# Patient Record
Sex: MALE | Race: WHITE | NOT HISPANIC OR LATINO | Employment: FULL TIME | URBAN - NONMETROPOLITAN AREA
[De-identification: names, ages, dates, MRNs, and addresses within clinical notes are randomized per-mention and may not be internally consistent; named-entity substitution may affect disease eponyms.]

---

## 2017-08-12 ENCOUNTER — OFFICE VISIT (OUTPATIENT)
Dept: URGENT CARE | Facility: PHYSICIAN GROUP | Age: 37
End: 2017-08-12
Payer: COMMERCIAL

## 2017-08-12 VITALS
TEMPERATURE: 98.2 F | RESPIRATION RATE: 18 BRPM | BODY MASS INDEX: 19.78 KG/M2 | OXYGEN SATURATION: 94 % | DIASTOLIC BLOOD PRESSURE: 70 MMHG | HEART RATE: 114 BPM | HEIGHT: 67 IN | WEIGHT: 126 LBS | SYSTOLIC BLOOD PRESSURE: 104 MMHG

## 2017-08-12 DIAGNOSIS — R06.2 WHEEZE: ICD-10-CM

## 2017-08-12 DIAGNOSIS — R06.02 SOB (SHORTNESS OF BREATH): Primary | ICD-10-CM

## 2017-08-12 PROCEDURE — 99203 OFFICE O/P NEW LOW 30 MIN: CPT | Mod: 25 | Performed by: PHYSICIAN ASSISTANT

## 2017-08-12 PROCEDURE — 94640 AIRWAY INHALATION TREATMENT: CPT | Performed by: PHYSICIAN ASSISTANT

## 2017-08-12 RX ORDER — ALBUTEROL SULFATE 90 UG/1
2 AEROSOL, METERED RESPIRATORY (INHALATION) EVERY 4 HOURS PRN
Qty: 1 INHALER | Refills: 0 | Status: SHIPPED | OUTPATIENT
Start: 2017-08-12

## 2017-08-12 RX ORDER — OXYCODONE AND ACETAMINOPHEN 10; 325 MG/1; MG/1
TABLET ORAL
Refills: 0 | COMMUNITY
Start: 2017-08-04

## 2017-08-12 RX ORDER — ALBUTEROL SULFATE 2.5 MG/3ML
2.5 SOLUTION RESPIRATORY (INHALATION) ONCE
Status: COMPLETED | OUTPATIENT
Start: 2017-08-12 | End: 2017-08-12

## 2017-08-12 RX ORDER — OXYMORPHONE HYDROCHLORIDE 15 MG/1
TABLET, FILM COATED, EXTENDED RELEASE ORAL
Refills: 0 | COMMUNITY
Start: 2017-06-06

## 2017-08-12 RX ORDER — ZOLPIDEM TARTRATE 10 MG/1
TABLET ORAL
Refills: 0 | COMMUNITY
Start: 2017-08-01

## 2017-08-12 RX ADMIN — ALBUTEROL SULFATE 2.5 MG: 2.5 SOLUTION RESPIRATORY (INHALATION) at 12:41

## 2017-08-12 NOTE — MR AVS SNAPSHOT
"        Jean Marie Field   2017 11:05 AM   Office Visit   MRN: [id number]    Department:  [facility]   Dept Phone:  [phone]    Description:  Male : 1980   Provider:  Sander Howell PA-C           Reason for Visit     Shortness of Breath difficulty breathing, slight unprductive cough      Allergies as of 2017     Allergen Noted Reactions    Amoxicillin 2014       Rash per mother    Prednisone 2014       Vascular necrosis to hip bones      You were diagnosed with     SOB (shortness of breath)   [840919]  -  Primary     Wheeze   [[id number]]         Vital Signs     Blood Pressure Pulse Temperature Respirations Height Weight    104/70 mmHg 114 36.8 °C (98.2 °F) 18 1.702 m (5' 7\") 57.153 kg (126 lb)    Body Mass Index Oxygen Saturation Smoking Status             19.73 kg/m2 94% Current Every Day Smoker         Basic Information     Date Of Birth Sex Race Ethnicity Preferred Language    1980 Male White Non- English      Problem List              ICD-10-CM Priority Class Noted - Resolved    Seizure (CMS-HCC) R56.9   2014 - Present    Tobacco abuse Z72.0   2014 - Present      Health Maintenance        Date Due Completion Dates    IMM DTaP/Tdap/Td Vaccine (1 - Tdap) 7/10/1998 ---    IMM PNEUMOCOCCAL 19-64 (ADULT) MEDIUM RISK SERIES (1 of 1 - PPSV23) 7/10/1998 ---    IMM INFLUENZA (1) 2017 ---            Current Immunizations     No immunizations on file.      Below and/or attached are the medications your provider expects you to take. Review all of your home medications and newly ordered medications with your provider and/or pharmacist. Follow medication instructions as directed by your provider and/or pharmacist. Please keep your medication list with you and share with your provider. Update the information when medications are discontinued, doses are changed, or new medications (including over-the-counter products) are added; and carry medication information at " all times in the event of emergency situations     Allergies:  AMOXICILLIN - (reactions not documented)     PREDNISONE - (reactions not documented)               Medications  Valid as of: August 12, 2017 - 11:59 AM    Generic Name Brand Name Tablet Size Instructions for use    Albuterol Sulfate (Aero Soln) albuterol 108 (90 BASE) MCG/ACT Inhale 2 Puffs by mouth every four hours as needed.        Carisoprodol (Tab) SOMA 350 MG Take 1 Tab by mouth at bedtime as needed for Muscle Spasms.        Cyclobenzaprine HCl (Tab) FLEXERIL 10 MG Take 1 Tab by mouth 3 times a day as needed for Muscle Spasms.        FentaNYL (PATCH 72 HR) DURAGESIC 25 MCG/HR Apply 1 Patch to skin as directed every 72 hours.        Nicotine (PATCH 24 HR) NICODERM 14 MG/24HR Apply 1 Patch to skin as directed every day.        OxyCODONE HCl (Tab) ROXICODONE 5 MG Take 1 Tab by mouth every four hours as needed for Mild Pain.        Oxycodone-Acetaminophen (Tab) PERCOCET-10  MG TAKE 1 TAB BY MOUTH 4 TIMES A DAY AS NEEDED FOR PAIN        OxyMORphone HCl (TABLET SR 12 HR) Oxymorphone HCl 15 MG TAKE 1 TAB BY MOUTH TWICE DAILY        PARoxetine HCl (Tab) PAXIL 20 MG Take 20 mg by mouth every day.        Zolpidem Tartrate (Tab CR) AMBIEN CR 12.5 MG Take 12.5 mg by mouth at bedtime as needed. Indications: Trouble Sleeping        Zolpidem Tartrate (Tab) AMBIEN 10 MG TAKE 1 TAB BY MOUTH AT BEDTIME        .                 Medicines prescribed today were sent to:     [facility][id number] - [place] - [address]    [facility] [address] [place] [address]    Phone: [phone] Fax: [phone]    Open 24 Hours?: No      Medication refill instructions:       If your prescription bottle indicates you have medication refills left, it is not necessary to call your provider’s office. Please contact your pharmacy and they will refill your medication.    If your prescription bottle indicates you do not have any refills left, you may request refills at any time  through one of the following ways: The online Wonder[employer] system (except Urgent Care), by calling your provider’s office, or by asking your pharmacy to contact your provider’s office with a refill request. Medication refills are processed only during regular business hours and may not be available until the next business day. Your provider may request additional information or to have a follow-up visit with you prior to refilling your medication.   *Please Note: Medication refills are assigned a new Rx number when refilled electronically. Your pharmacy may indicate that no refills were authorized even though a new prescription for the same medication is available at the pharmacy. Please request the medicine by name with the pharmacy before contacting your provider for a refill.           WonderHill Access Code: [email or web address]  Expires: 9/11/2017 11:59 AM    [employer]  A secure, online tool to manage your health information     [email or web address]’s WonderHill® is a secure, online tool that connects you to your personalized health information from the privacy of your home -- day or night - making it very easy for you to manage your healthcare. Once the activation process is completed, you can even access your medical information using the WonderHill armando, which is available for free in the Apple Armando store or Google Play store.     WonderHill provides the following levels of access (as shown below):   My Chart Features   [facility] Doctor [facility]  Specialists [facility]  Urgent  Care Non-[facility]  Primary Care  Doctor   Email your healthcare team securely and privately 24/7 X X X    Manage appointments: schedule your next appointment; view details of past/upcoming appointments X      Request prescription refills. X      View recent personal medical records, including lab and immunizations X X X X   View health record, including health history, allergies, medications X X X X   Read reports about your outpatient visits, procedures, consult  and ER notes X X X X   See your discharge summary, which is a recap of your hospital and/or ER visit that includes your diagnosis, lab results, and care plan. X X       How to register for [employer]:  1. Go to  [email or web address].  2. Click on the Sign Up Now box, which takes you to the New Member Sign Up page. You will need to provide the following information:  a. Enter your Konotor Access Code exactly as it appears at the top of this page. (You will not need to use this code after you’ve completed the sign-up process. If you do not sign up before the expiration date, you must request a new code.)   b. Enter your date of birth.   c. Enter your home email address.   d. Click Submit, and follow the next screen’s instructions.  3. Create a Konotor ID. This will be your Konotor login ID and cannot be changed, so think of one that is secure and easy to remember.  4. Create a Konotor password. You can change your password at any time.  5. Enter your Password Reset Question and Answer. This can be used at a later time if you forget your password.   6. Enter your e-mail address. This allows you to receive e-mail notifications when new information is available in Konotor.  7. Click Sign Up. You can now view your health information.    For assistance activating your Konotor account, call [phone]        Quit Tobacco Information     Do you want to quit using tobacco?    Quitting tobacco decreases risks of cancer, heart and lung disease, increases life expectancy, improves sense of taste and smell, and increases spending money, among other benefits.    If you are thinking about quitting, we can help.  • [employer] Quit Tobacco Program: 990.174.2951  o Program occurs weekly for four weeks and includes pharmacist consultation on products to support quitting smoking or chewing tobacco. A provider referral is needed for pharmacist consultation.  • Tobacco Users Help Hotline: 7-734-QUIT-NOW (625-0669) or  https://[place].quitlogix.org/  o Free, confidential telephone and online coaching for [place] residents. Sessions are designed on a schedule that is convenient for you. Eligible clients receive free nicotine replacement therapy.  • Nationally: www.smokefree.gov  o Information and professional assistance to support both immediate and long-term needs as you become, and remain, a non-smoker. Smokefree.gov allows you to choose the help that best fits your needs.

## 2017-08-12 NOTE — PROGRESS NOTES
Chief Complaint   Patient presents with   • Shortness of Breath     difficulty breathing, slight unprductive cough       HISTORY OF PRESENT ILLNESS: Patient is a 37 y.o. male who presents today because he has only history of difficulty breathing, wheezing, dry cough. He has been getting worse, sometimes having to rest with even short walking. He has not been taking any medications for his symptoms, he tried to find an old Primatene mist inhaler but he could not find one. He does not have a history of asthma, but has had episodes like this years ago    Patient Active Problem List    Diagnosis Date Noted   • Seizure (CMS-[facility]) 04/12/2014   • Tobacco abuse 04/12/2014       Allergies:Amoxicillin and Prednisone    Current Outpatient Prescriptions Ordered in [facility]   Medication Sig Dispense Refill   • oxycodone-acetaminophen (PERCOCET-10)  MG Tab TAKE 1 TAB BY MOUTH 4 TIMES A DAY AS NEEDED FOR PAIN  0   • albuterol 108 (90 BASE) MCG/ACT Aero Soln inhalation aerosol Inhale 2 Puffs by mouth every four hours as needed. 1 Inhaler 0   • zolpidem (AMBIEN) 10 MG Tab TAKE 1 TAB BY MOUTH AT BEDTIME  0   • Oxymorphone HCl 15 MG TABLET SR 12 HR TAKE 1 TAB BY MOUTH TWICE DAILY  0   • carisoprodol (SOMA) 350 MG TABS Take 1 Tab by mouth at bedtime as needed for Muscle Spasms. 30 Tab 0   • fentanyl (DURAGESIC) 25 MCG/HR PT72 Apply 1 Patch to skin as directed every 72 hours. 2 Patch 0   • nicotine (NICODERM) 14 MG/24HR PT24 Apply 1 Patch to skin as directed every day. 30 Patch 0   • oxycodone, immediate release, (ROXICODONE) 5 MG TABS Take 1 Tab by mouth every four hours as needed for Mild Pain. 10 Tab 0   • cyclobenzaprine (FLEXERIL) 10 MG TABS Take 1 Tab by mouth 3 times a day as needed for Muscle Spasms. 10 Tab 0   • paroxetine (PAXIL) 20 MG TABS Take 20 mg by mouth every day.     • zolpidem (AMBIEN CR) 12.5 MG CR tablet Take 12.5 mg by mouth at bedtime as needed. Indications: Trouble Sleeping       Current Facility-Administered  "Medications Ordered in Epic   Medication Dose Route Frequency Provider Last Rate Last Dose   • albuterol (PROVENTIL) 2.5mg/3ml nebulizer solution 2.5 mg  2.5 mg Nebulization Once Sander Howell PA-C           Past Medical History   Diagnosis Date   • Avascular necrosis of femur head      secondary to prednisone   • Seizure (CMS-HCC)        Social History   Substance Use Topics   • Smoking status: Current Every Day Smoker -- 1.00 packs/day     Types: Cigarettes   • Smokeless tobacco: None   • Alcohol Use: No      Comment: sober for 7 years       No family status information on file.   History reviewed. No pertinent family history.    ROS:  Review of Systems   Constitutional: Negative for fever, chills, weight loss and malaise/fatigue.   HENT: Negative for ear pain, nosebleeds, congestion, sore throat and neck pain.    Eyes: Negative for blurred vision.   Respiratory: Positive for cough, no sputum production, positive for shortness of breath and wheezing.    Cardiovascular: Negative for chest pain, palpitations, orthopnea and leg swelling.   Gastrointestinal: Negative for heartburn, nausea, vomiting and abdominal pain.   Genitourinary: Negative for dysuria, urgency and frequency.     Exam:  Blood pressure 104/70, pulse 114, temperature 36.8 °C (98.2 °F), resp. rate 18, height 1.702 m (5' 7\"), weight 57.153 kg (126 lb), SpO2 94 %.  General:  Well nourished, well developed male in NAD  Head:Normocephalic, atraumatic  Eyes: PERRLA, EOM within normal limits, no conjunctival injection, no scleral icterus, visual fields and acuity grossly intact.  Ears: Normal shape and symmetry, no tenderness, no discharge. External canals are without any significant edema or erythema. Tympanic membranes are without any inflammation, no effusion. Gross auditory acuity is intact  Nose: Symmetrical without tenderness, no discharge.  Mouth: reasonable hygiene, no erythema exudates or tonsillar enlargement.  Neck: no masses, range of motion " within normal limits, no tracheal deviation. No obvious thyroid enlargement.  Pulmonary: chest is symmetrical with respiration, diminished bilaterally with few scattered wheezes. After nebulization of albuterol the office, there is increased air exchange, few scattered wheezes, patient states subjective improvement  Cardiovascular: regular rate and rhythm without murmurs, rubs, or gallops.  Extremities: no clubbing, cyanosis, or edema.    Please note that this dictation was created using voice recognition software. I have made every reasonable attempt to correct obvious errors, but I expect that there are errors of grammar and possibly content that I did not discover before finalizing the note.    Assessment/Plan:  1. SOB (shortness of breath)  albuterol 108 (90 BASE) MCG/ACT Aero Soln inhalation aerosol    albuterol (PROVENTIL) 2.5mg/3ml nebulizer solution 2.5 mg   2. Wheeze  albuterol 108 (90 BASE) MCG/ACT Aero Soln inhalation aerosol    albuterol (PROVENTIL) 2.5mg/3ml nebulizer solution 2.5 mg    patient stated significant subjective improvement, there is a drastic increase in air exchange after the nebulizer.    Followup with primary care in the next 7-10 days if not significantly improving, return to the urgent care or go to the emergency room sooner for any worsening of symptoms.

## 2018-12-07 ENCOUNTER — OFFICE VISIT (OUTPATIENT)
Dept: URGENT CARE | Facility: PHYSICIAN GROUP | Age: 38
End: 2018-12-07
Payer: COMMERCIAL

## 2018-12-07 VITALS
SYSTOLIC BLOOD PRESSURE: 124 MMHG | HEART RATE: 108 BPM | BODY MASS INDEX: 20.72 KG/M2 | WEIGHT: 132 LBS | RESPIRATION RATE: 16 BRPM | TEMPERATURE: 98.9 F | HEIGHT: 67 IN | OXYGEN SATURATION: 96 % | DIASTOLIC BLOOD PRESSURE: 70 MMHG

## 2018-12-07 DIAGNOSIS — R06.2 WHEEZE: ICD-10-CM

## 2018-12-07 DIAGNOSIS — J00 ACUTE NASOPHARYNGITIS: ICD-10-CM

## 2018-12-07 DIAGNOSIS — R05.9 COUGH: ICD-10-CM

## 2018-12-07 PROCEDURE — 99214 OFFICE O/P EST MOD 30 MIN: CPT | Performed by: PHYSICIAN ASSISTANT

## 2018-12-07 RX ORDER — ALBUTEROL SULFATE 90 UG/1
2 AEROSOL, METERED RESPIRATORY (INHALATION) EVERY 4 HOURS PRN
Qty: 1 INHALER | Refills: 0 | Status: SHIPPED | OUTPATIENT
Start: 2018-12-07

## 2018-12-07 RX ORDER — BENZONATATE 200 MG/1
200 CAPSULE ORAL 3 TIMES DAILY PRN
Qty: 60 CAP | Refills: 0 | Status: SHIPPED | OUTPATIENT
Start: 2018-12-07

## 2018-12-07 NOTE — PATIENT INSTRUCTIONS
"Upper Respiratory Infection, Adult  Most upper respiratory infections (URIs) are a viral infection of the air passages leading to the lungs. A URI affects the nose, throat, and upper air passages. The most common type of URI is nasopharyngitis and is typically referred to as \"the common cold.\"  URIs run their course and usually go away on their own. Most of the time, a URI does not require medical attention, but sometimes a bacterial infection in the upper airways can follow a viral infection. This is called a secondary infection. Sinus and middle ear infections are common types of secondary upper respiratory infections.  Bacterial pneumonia can also complicate a URI. A URI can worsen asthma and chronic obstructive pulmonary disease (COPD). Sometimes, these complications can require emergency medical care and may be life threatening.  What are the causes?  Almost all URIs are caused by viruses. A virus is a type of germ and can spread from one person to another.  What increases the risk?  You may be at risk for a URI if:  · You smoke.  · You have chronic heart or lung disease.  · You have a weakened defense (immune) system.  · You are very young or very old.  · You have nasal allergies or asthma.  · You work in crowded or poorly ventilated areas.  · You work in health care facilities or schools.  What are the signs or symptoms?  Symptoms typically develop 2-3 days after you come in contact with a cold virus. Most viral URIs last 7-10 days. However, viral URIs from the influenza virus (flu virus) can last 14-18 days and are typically more severe. Symptoms may include:  · Runny or stuffy (congested) nose.  · Sneezing.  · Cough.  · Sore throat.  · Headache.  · Fatigue.  · Fever.  · Loss of appetite.  · Pain in your forehead, behind your eyes, and over your cheekbones (sinus pain).  · Muscle aches.  How is this diagnosed?  Your health care provider may diagnose a URI by:  · Physical exam.  · Tests to check that your " symptoms are not due to another condition such as:  ¨ Strep throat.  ¨ Sinusitis.  ¨ Pneumonia.  ¨ Asthma.  How is this treated?  A URI goes away on its own with time. It cannot be cured with medicines, but medicines may be prescribed or recommended to relieve symptoms. Medicines may help:  · Reduce your fever.  · Reduce your cough.  · Relieve nasal congestion.  Follow these instructions at home:  · Take medicines only as directed by your health care provider.  · Gargle warm saltwater or take cough drops to comfort your throat as directed by your health care provider.  · Use a warm mist humidifier or inhale steam from a shower to increase air moisture. This may make it easier to breathe.  · Drink enough fluid to keep your urine clear or pale yellow.  · Eat soups and other clear broths and maintain good nutrition.  · Rest as needed.  · Return to work when your temperature has returned to normal or as your health care provider advises. You may need to stay home longer to avoid infecting others. You can also use a face mask and careful hand washing to prevent spread of the virus.  · Increase the usage of your inhaler if you have asthma.  · Do not use any tobacco products, including cigarettes, chewing tobacco, or electronic cigarettes. If you need help quitting, ask your health care provider.  How is this prevented?  The best way to protect yourself from getting a cold is to practice good hygiene.  · Avoid oral or hand contact with people with cold symptoms.  · Wash your hands often if contact occurs.  There is no clear evidence that vitamin C, vitamin E, echinacea, or exercise reduces the chance of developing a cold. However, it is always recommended to get plenty of rest, exercise, and practice good nutrition.  Contact a health care provider if:  · You are getting worse rather than better.  · Your symptoms are not controlled by medicine.  · You have chills.  · You have worsening shortness of breath.  · You have brown  or red mucus.  · You have yellow or brown nasal discharge.  · You have pain in your face, especially when you bend forward.  · You have a fever.  · You have swollen neck glands.  · You have pain while swallowing.  · You have white areas in the back of your throat.  Get help right away if:  · You have severe or persistent:  ¨ Headache.  ¨ Ear pain.  ¨ Sinus pain.  ¨ Chest pain.  · You have chronic lung disease and any of the following:  ¨ Wheezing.  ¨ Prolonged cough.  ¨ Coughing up blood.  ¨ A change in your usual mucus.  · You have a stiff neck.  · You have changes in your:  ¨ Vision.  ¨ Hearing.  ¨ Thinking.  ¨ Mood.  This information is not intended to replace advice given to you by your health care provider. Make sure you discuss any questions you have with your health care provider.  Document Released: 06/13/2002 Document Revised: 08/20/2017 Document Reviewed: 03/25/2015  [employer] Interactive Patient Education © 2017 [employer]

## 2018-12-07 NOTE — PROGRESS NOTES
Chief Complaint   Patient presents with   • Cough       HISTORY OF PRESENT ILLNESS: Patient is a 38 y.o. male who presents today because he has a 2-day history of sore throat, nasal congestion, starting to lose his voice.  He does have an albuterol inhaler at home, has used it once it did not provide any significant relief.  Denies any fevers, chills, nausea, vomiting or diarrhea.    Patient Active Problem List    Diagnosis Date Noted   • Seizure (HCC) 04/12/2014   • Tobacco abuse 04/12/2014       Allergies:Amoxicillin and Prednisone    Current Outpatient Prescriptions Ordered in [facility]   Medication Sig Dispense Refill   • benzonatate (TESSALON) 200 MG capsule Take 1 Cap by mouth 3 times a day as needed for Cough. 60 Cap 0   • albuterol 108 (90 Base) MCG/ACT Aero Soln inhalation aerosol Inhale 2 Puffs by mouth every four hours as needed. 1 Inhaler 0   • zolpidem (AMBIEN) 10 MG Tab TAKE 1 TAB BY MOUTH AT BEDTIME  0   • Oxymorphone HCl 15 MG TABLET SR 12 HR TAKE 1 TAB BY MOUTH TWICE DAILY  0   • oxycodone-acetaminophen (PERCOCET-10)  MG Tab TAKE 1 TAB BY MOUTH 4 TIMES A DAY AS NEEDED FOR PAIN  0   • albuterol 108 (90 BASE) MCG/ACT Aero Soln inhalation aerosol Inhale 2 Puffs by mouth every four hours as needed. 1 Inhaler 0   • carisoprodol (SOMA) 350 MG TABS Take 1 Tab by mouth at bedtime as needed for Muscle Spasms. 30 Tab 0   • fentanyl (DURAGESIC) 25 MCG/HR PT72 Apply 1 Patch to skin as directed every 72 hours. 2 Patch 0   • nicotine (NICODERM) 14 MG/24HR PT24 Apply 1 Patch to skin as directed every day. 30 Patch 0   • oxycodone, immediate release, (ROXICODONE) 5 MG TABS Take 1 Tab by mouth every four hours as needed for Mild Pain. 10 Tab 0   • cyclobenzaprine (FLEXERIL) 10 MG TABS Take 1 Tab by mouth 3 times a day as needed for Muscle Spasms. 10 Tab 0   • paroxetine (PAXIL) 20 MG TABS Take 20 mg by mouth every day.     • zolpidem (AMBIEN CR) 12.5 MG CR tablet Take 12.5 mg by mouth at bedtime as needed.  "Indications: Trouble Sleeping       No current Epic-ordered facility-administered medications on file.        Past Medical History:   Diagnosis Date   • Avascular necrosis of femur head     secondary to prednisone   • Seizure (HCC)        Social History   Substance Use Topics   • Smoking status: Current Every Day Smoker     Packs/day: 1.00     Types: Cigarettes   • Smokeless tobacco: Never Used   • Alcohol use No      Comment: sober for 7 years       No family status information on file.   No family history on file.    ROS:  Review of Systems   Constitutional: Negative for fever, chills, weight loss and malaise/fatigue.   HENT: Negative for ear pain, nosebleeds, positive for congestion, sore throat and no neck pain.    Eyes: Negative for blurred vision.   Respiratory: Positive for cough, no sputum production, shortness of breath and wheezing.    Cardiovascular: Negative for chest pain, palpitations, orthopnea and leg swelling.   Gastrointestinal: Negative for heartburn, nausea, vomiting and abdominal pain.   Genitourinary: Negative for dysuria, urgency and frequency.     Exam:  Blood pressure 124/70, pulse (!) 108, temperature 37.2 °C (98.9 °F), temperature source Temporal, resp. rate 16, height 1.702 m (5' 7\"), weight 59.9 kg (132 lb), SpO2 96 %.  General:  Well nourished, well developed male in NAD, poor vocal tone  Head:Normocephalic, atraumatic  Eyes: PERRLA, EOM within normal limits, no conjunctival injection, no scleral icterus, visual fields and acuity grossly intact.  Ears: Normal shape and symmetry, no tenderness, no discharge. External canals are without any significant edema or erythema. Tympanic membranes are without any inflammation, no effusion. Gross auditory acuity is intact  Nose: Symmetrical without tenderness, clear discharge.  Mouth: reasonable hygiene, no erythema exudates or tonsillar enlargement.  Neck: no masses, range of motion within normal limits, no tracheal deviation. No obvious thyroid " enlargement.  Pulmonary: chest is symmetrical with respiration, rare wheeze, no rales or rhonchi   cardiovascular: regular rate and rhythm without murmurs, rubs, or gallops.  Extremities: no clubbing, cyanosis, or edema.    Please note that this dictation was created using voice recognition software. I have made every reasonable attempt to correct obvious errors, but I expect that there are errors of grammar and possibly content that I did not discover before finalizing the note.    Assessment/Plan:  1. Acute nasopharyngitis     2. Wheeze  albuterol 108 (90 Base) MCG/ACT Aero Soln inhalation aerosol   3. Cough  benzonatate (TESSALON) 200 MG capsule   Over-the-counter symptomatic relief, discussed likely viral illness.    Followup with primary care in the next 7-10 days if not significantly improving, return to the urgent care or go to the emergency room sooner for any worsening of symptoms.

## 2024-09-14 ENCOUNTER — OFFICE VISIT (OUTPATIENT)
Dept: URGENT CARE | Facility: PHYSICIAN GROUP | Age: 44
End: 2024-09-14
Payer: COMMERCIAL

## 2024-09-14 VITALS
HEIGHT: 67 IN | SYSTOLIC BLOOD PRESSURE: 116 MMHG | TEMPERATURE: 98.1 F | RESPIRATION RATE: 16 BRPM | WEIGHT: 129 LBS | DIASTOLIC BLOOD PRESSURE: 62 MMHG | OXYGEN SATURATION: 97 % | BODY MASS INDEX: 20.25 KG/M2 | HEART RATE: 84 BPM

## 2024-09-14 DIAGNOSIS — K05.6 CHRONIC PERIODONTAL DISEASE: ICD-10-CM

## 2024-09-14 DIAGNOSIS — K04.7 DENTAL ABSCESS: ICD-10-CM

## 2024-09-14 PROCEDURE — 3078F DIAST BP <80 MM HG: CPT | Performed by: NURSE PRACTITIONER

## 2024-09-14 PROCEDURE — 3074F SYST BP LT 130 MM HG: CPT | Performed by: NURSE PRACTITIONER

## 2024-09-14 PROCEDURE — 1125F AMNT PAIN NOTED PAIN PRSNT: CPT | Performed by: NURSE PRACTITIONER

## 2024-09-14 PROCEDURE — 99204 OFFICE O/P NEW MOD 45 MIN: CPT | Performed by: NURSE PRACTITIONER

## 2024-09-14 RX ORDER — CLINDAMYCIN HCL 300 MG
300 CAPSULE ORAL 3 TIMES DAILY
Qty: 21 CAPSULE | Refills: 0 | Status: SHIPPED | OUTPATIENT
Start: 2024-09-14 | End: 2024-09-14

## 2024-09-14 RX ORDER — IBUPROFEN 600 MG/1
600 TABLET, FILM COATED ORAL EVERY 8 HOURS PRN
Qty: 15 TABLET | Refills: 0 | Status: SHIPPED | OUTPATIENT
Start: 2024-09-14 | End: 2024-09-19

## 2024-09-14 RX ORDER — CLINDAMYCIN HCL 300 MG
300 CAPSULE ORAL 3 TIMES DAILY
Qty: 21 CAPSULE | Refills: 0 | Status: SHIPPED | OUTPATIENT
Start: 2024-09-14 | End: 2024-09-21

## 2024-09-14 RX ORDER — IBUPROFEN 600 MG/1
600 TABLET, FILM COATED ORAL EVERY 8 HOURS PRN
Qty: 15 TABLET | Refills: 0 | Status: SHIPPED | OUTPATIENT
Start: 2024-09-14 | End: 2024-09-14

## 2024-09-14 ASSESSMENT — PAIN SCALES - GENERAL: PAINLEVEL: 4=SLIGHT-MODERATE PAIN

## 2024-09-14 NOTE — PROGRESS NOTES
"Subjective:   Jean Marie Field is a 44 y.o. male who presents for Oral Swelling (Left sided dental swelling and pain x2 day)      This is a chronic condition with exacerbation.  Patient states he has severe periodontal disease with multiple broken worn down teeth throughout his mouth.  Over the past 2 days his left lower premolar has started become tender to the touch and has had lower jaw swelling.  He has not had any fevers, chills, or nausea.  Has been taking over-the-counter ibuprofen which has helped some with the pain.  He does plan to have full extraction of his teeth and dentures/implants completed in the future.  Patient denies smoking cigarettes but does admit to using a vape pen.      Medications, Allergies, and current problem list reviewed today in Epic.     Objective:     /62   Pulse 84   Temp 36.7 °C (98.1 °F) (Temporal)   Resp 16   Ht 1.702 m (5' 7\")   Wt 58.5 kg (129 lb)   SpO2 97%     Physical Exam  Vitals reviewed.   Constitutional:       General: He is not in acute distress.     Appearance: Normal appearance. He is not ill-appearing or toxic-appearing.   HENT:      Head: Normocephalic.      Nose: Nose normal.      Mouth/Throat:      Lips: Pink. No lesions.      Mouth: Mucous membranes are moist.      Dentition: Abnormal dentition. Does not have dentures. Dental tenderness, gingival swelling, dental caries and dental abscesses present.        Comments: Severe periodontal disease throughout oral cavity with multiple broken and worn down teeth.  Positive for dental abscess on left lower premolars.  Eyes:      Extraocular Movements: Extraocular movements intact.      Conjunctiva/sclera: Conjunctivae normal.      Pupils: Pupils are equal, round, and reactive to light.   Cardiovascular:      Rate and Rhythm: Normal rate.   Pulmonary:      Effort: Pulmonary effort is normal.   Musculoskeletal:         General: Normal range of motion.      Cervical back: Normal range of motion and neck supple. "   Skin:     General: Skin is warm and dry.   Neurological:      General: No focal deficit present.      Mental Status: He is alert and oriented to person, place, and time.   Psychiatric:         Mood and Affect: Mood normal.         Behavior: Behavior normal.         Assessment/Plan:     Diagnosis and associated orders:     1. Dental abscess  clindamycin (CLEOCIN) 300 MG Cap    ibuprofen (MOTRIN) 600 MG Tab      2. Chronic periodontal disease           Comments/MDM:     Is a chronic condition with exacerbation as patient has severe periodontal disease that he has been struggling with for many years.  Patient is nontoxic-appearing and is in no acute distress.  Patient presents clinic today with dental abscess.  Will go ahead and place on clindamycin as patient has penicillin/amoxicillin allergy.  Ibuprofen was prescribed to help with pain.  OTC Tylenol or Motrin for fever/discomfort.  Ice  Drink plenty of fluids  Follow-up with dentist as soon as possible  Return to clinic or go to the ED if symptoms worsen or fail to improve, or if the patient should develop worsening/increasing dental pain, facial swelling, redness or warmth to the affected area, difficulty swallowing, shortness of breath, fever/chills, and/or any concerning symptoms.    Patient was involved with shared decision-making throughout the exam today and verbalizes understanding regards to plan of care, discharge instructions, and follow-up         Differential diagnosis, natural history, supportive care, and indications for immediate follow-up discussed.    Advised the patient to follow-up with the primary care physician for recheck, reevaluation, and consideration of further management.    I personally reviewed prior external notes and test results pertinent to today's visit as well as additional imaging and testing completed in clinic today.     Please note that this dictation was created using voice recognition software. I have made a reasonable  attempt to correct obvious errors, but I expect that there are errors of grammar and possibly content that I did not discover before finalizing the note.